# Patient Record
Sex: MALE | Race: BLACK OR AFRICAN AMERICAN | NOT HISPANIC OR LATINO | Employment: UNEMPLOYED | URBAN - METROPOLITAN AREA
[De-identification: names, ages, dates, MRNs, and addresses within clinical notes are randomized per-mention and may not be internally consistent; named-entity substitution may affect disease eponyms.]

---

## 2024-10-09 ENCOUNTER — PATIENT MESSAGE (OUTPATIENT)
Dept: PRIMARY CARE CLINIC | Facility: CLINIC | Age: 34
End: 2024-10-09

## 2024-10-09 ENCOUNTER — OFFICE VISIT (OUTPATIENT)
Dept: PRIMARY CARE CLINIC | Facility: CLINIC | Age: 34
End: 2024-10-09
Payer: MEDICAID

## 2024-10-09 VITALS
SYSTOLIC BLOOD PRESSURE: 138 MMHG | TEMPERATURE: 98 F | HEIGHT: 69 IN | OXYGEN SATURATION: 97 % | WEIGHT: 223.38 LBS | DIASTOLIC BLOOD PRESSURE: 82 MMHG | HEART RATE: 76 BPM | BODY MASS INDEX: 33.08 KG/M2

## 2024-10-09 DIAGNOSIS — Z79.899 ENCOUNTER FOR LONG-TERM CURRENT USE OF MEDICATION: ICD-10-CM

## 2024-10-09 DIAGNOSIS — Z00.01 ENCOUNTER FOR GENERAL ADULT MEDICAL EXAMINATION WITH ABNORMAL FINDINGS: Primary | ICD-10-CM

## 2024-10-09 DIAGNOSIS — R39.9 LOWER URINARY TRACT SYMPTOMS: ICD-10-CM

## 2024-10-09 DIAGNOSIS — Z11.3 SCREEN FOR STD (SEXUALLY TRANSMITTED DISEASE): ICD-10-CM

## 2024-10-09 DIAGNOSIS — I10 ESSENTIAL HYPERTENSION: ICD-10-CM

## 2024-10-09 PROCEDURE — 99203 OFFICE O/P NEW LOW 30 MIN: CPT | Mod: PBBFAC,PN | Performed by: FAMILY MEDICINE

## 2024-10-09 PROCEDURE — 99999 PR PBB SHADOW E&M-NEW PATIENT-LVL III: CPT | Mod: PBBFAC,,, | Performed by: FAMILY MEDICINE

## 2024-10-09 RX ORDER — QUETIAPINE FUMARATE 200 MG/1
200 TABLET, FILM COATED ORAL NIGHTLY
COMMUNITY
Start: 2024-09-16

## 2024-10-09 RX ORDER — LISINOPRIL AND HYDROCHLOROTHIAZIDE 10; 12.5 MG/1; MG/1
1 TABLET ORAL
COMMUNITY

## 2024-10-09 RX ORDER — OLANZAPINE 10 MG/1
10 TABLET ORAL NIGHTLY
COMMUNITY
Start: 2024-08-07

## 2024-10-09 NOTE — PROGRESS NOTES
Subjective:      Chief Complaint   Patient presents with    [facility]     Insomnia/night sweats ( 1 week)/ trouble urinating (2 months)- family history of kidney failure    Annual Exam     Urinary problem      Patient ID: Levi May is a 34 y.o. male.  History of Present Illness    Here with a friend who contributed to the history  Well Adult Physical: Patient here for a comprehensive physical exam.The patient reports problems - Diff urinating    Patient is in a drug rehab program and already running late for his appt, he will come back at a later date for his lab work.   URINARY SYMPTOMS:  He reports difficulty urinating for the past 2 weeks, experiencing slow urine flow and a feeling of incomplete bladder emptying. He also reports nocturia, waking up several times during the night to urinate. He denies pain, blood in urine, or fever associated with these symptoms.    SLEEP ISSUES:  He reports insomnia and night sweats. He is on Seroquel.    MEDICATIONS:  Current medications include Seroquel, Zyprexa, and Lisinopril. He started Lisinopril approximately 2-3 weeks ago for blood pressure management.    FAMILY HISTORY:  He reports a family history of kidney failure, possibly due to high blood pressure.    SOCIAL HISTORY:  He is currently attending a rehabilitation program and emphasizes the importance of not missing his scheduled sessions.      ROS:  General: -fever, -chills, -fatigue, -weight gain, -weight loss, +night sweats  Eyes: -vision changes, -redness, -discharge  ENT: -ear pain, -nasal congestion, -sore throat  Cardiovascular: -chest pain, -palpitations, -lower extremity edema  Respiratory: -cough, -shortness of breath  Gastrointestinal: -abdominal pain, -nausea, -vomiting, -diarrhea, -constipation, -blood in stool  Genitourinary: -dysuria, -hematuria, -frequency, +difficulty urinating, +nocturia  Musculoskeletal: -joint pain, -muscle pain  Skin: -rash, -lesion  Neurological: -headache, -dizziness,  -numbness, -tingling  Psychiatric: -anxiety, -depression, +sleep difficulty       Leviharish May's allergies, medications, history, and problem list were updated as appropriate.  Past Medical History:   Diagnosis Date    Hypertension      Family History   Problem Relation Name Age of Onset    Kidney failure Father       Social History     Socioeconomic History    Marital status: Single   Tobacco Use    Smoking status: Every Day     Types: Cigarettes     Passive exposure: Never    Smokeless tobacco: Never   Substance and Sexual Activity    Alcohol use: Yes    Drug use: Yes     Types: Marijuana    Sexual activity: Yes     Social Drivers of Health     Financial Resource Strain: Medium Risk (10/2/2024)    Overall Financial Resource Strain (CARDIA)     Difficulty of Paying Living Expenses: Somewhat hard   Food Insecurity: Food Insecurity Present (10/2/2024)    Hunger Vital Sign     Worried About Running Out of Food in the Last Year: Sometimes true     Ran Out of Food in the Last Year: Sometimes true   Physical Activity: Insufficiently Active (10/2/2024)    Exercise Vital Sign     Days of Exercise per Week: 2 days     Minutes of Exercise per Session: 30 min   Stress: Stress Concern Present (10/2/2024)    [employer] [place] [employer] - Occupational Stress Questionnaire     Feeling of Stress : Very much   Housing Stability: Unknown (10/2/2024)    Housing Stability Vital Sign     Unable to Pay for Housing in the Last Year: No     Outpatient Encounter Medications as of 10/9/2024   Medication Sig Dispense Refill    lisinopriL-hydrochlorothiazide (PRINZIDE,ZESTORETIC) 10-12.5 mg per tablet Take 1 tablet by mouth.      OLANZapine (ZYPREXA) 10 MG tablet Take 10 mg by mouth every evening.      QUEtiapine (SEROQUEL) 200 MG Tab Take 200 mg by mouth every evening.       No facility-administered encounter medications on file as of 10/9/2024.          Objective:      /82   Pulse 76   Temp 98.1 °F (36.7 °C)   Ht  "5' 9" (1.753 m)   Wt 101.3 kg (223 lb 6.4 oz)   SpO2 97%   BMI 32.99 kg/m²   Physical Exam   Physical Exam    General: In no acute distress.  Head: Normocephalic. Non traumatic.  Eyes: PERRLA. EOMs full. Conjunctivae clear. Fundi grossly normal.  Ears: EACs clear. TMs normal.  Nose: Mucosa pink. Mucosa moist. No obstruction.  Throat: Clear. No exudates. No lesions.  Neck: Supple. No masses. No thyromegaly. No bruits.  Chest: Lungs clear. No rales. No rhonchi. No wheezes.  Heart: RRR. No murmurs. No rubs. No gallops.  Abdomen: Soft. No tenderness. No masses. BS normal.  Extremities: Warm. Well perfused. No upper extremity edema. No lower extremity edema. FROM. No deformities. No joint erythema.  Neuro: No focal deficits appreciated. Good muscle tone. Normal response to visual stimuli. Normal response to auditory stimuli.  Skin: Normal. No rashes. No lesions noted.  Vitals: BLOOD PRESSURE 120/80.        No results found for this or any previous visit.  Assessment/Plan:         1. Encounter for general adult medical examination with abnormal findings    2. Encounter for long-term current use of medication    3. Screen for STD (sexually transmitted disease)    4. Essential hypertension    5. Lower urinary tract symptoms      Encounter for general adult medical examination with abnormal findings  -     CBC Auto Differential; Future; Expected date: 10/09/2024  -     Comprehensive Metabolic Panel; Future; Expected date: 10/09/2024  -     Hemoglobin A1C; Future; Expected date: 10/09/2024    Encounter for long-term current use of medication  -     TSH; Future; Expected date: 10/09/2024    Screen for STD (sexually transmitted disease)  -     Treponema Pallidium Antibodies IgG, IgM; Future; Expected date: 10/09/2024  -     Hepatitis C Antibody; Future; Expected date: 10/09/2024  -     HIV 1/2 Ag/Ab (4th Gen); Future; Expected date: 10/09/2024  -     C. trachomatis/N. gonorrhoeae by AMP DNA; Future; Expected date: " 10/09/2024  -     Hepatitis B Surface Antigen; Future; Expected date: 10/09/2024    Essential hypertension  -     Microalbumin/Creatinine Ratio, Urine; Future; Expected date: 10/09/2024  -     Lipid Panel; Future; Expected date: 10/09/2024    Lower urinary tract symptoms  -     US Retroperitoneal Complete; Future; Expected date: 10/09/2024  -     Urinalysis, Reflex to Urine Culture Urine, Clean Catch; Future; Expected date: 10/09/2024      URINARY SYMPTOMS:  - Considering potential medication with urinary retention and incomplete bladder emptying.  - Ordered post-void ultrasound to evaluate for urinary retention.      KIDNEY FUNCTION:  - Evaluating for possible kidney dysfunction due to family history of kidney failure, though patient's blood pressure is normal.  - Ordered comprehensive lab work to assess kidney function.    MEDICATIONS:  - Assessing current medications (Seroquel, Zyprexa, lisinopril) for potential impact on urinary symptoms, noting lisinopril use began after symptom onset.  - Continued Seroquel at current dose.  - Continued Zyprexa at current dose.  - Continued lisinopril at current dose.    FOLLOW UP:  - Follow up after tests are completed.  - Contact the office to schedule lab work and ultrasound.            I have reviewed all of the patient's clinical history available in care everywhere and Epic and have utilized this in my evaluation and management recommendations today.      Treatment options and alternatives were discussed with the patient. Patient was given ample time to ask questions. All questions were answered. Voices understanding and acceptance of this advice. Will call back if any further questions or concerns.       Portions of the record may have been created with voice recognition software. Occasional wrong-word or sound-a-like substitutions may have occurred due to the inherent limitations of voice recognition software. Read the chart carefully and recognize, using context,  where substitutions have occurred.      This note was generated with the assistance of ambient listening technology. Verbal consent was obtained by the patient and accompanying visitor(s) for the recording of patient appointment to facilitate this note. I attest to having reviewed and edited the generated note for accuracy, though some syntax or spelling errors may persist. Please contact the author of this note for any clarification.            Marissa Roche MD  [facility],